# Patient Record
Sex: MALE | URBAN - NONMETROPOLITAN AREA
[De-identification: names, ages, dates, MRNs, and addresses within clinical notes are randomized per-mention and may not be internally consistent; named-entity substitution may affect disease eponyms.]

---

## 2022-03-14 ENCOUNTER — APPOINTMENT (RX ONLY)
Dept: URBAN - NONMETROPOLITAN AREA CLINIC 13 | Facility: CLINIC | Age: 62
Setting detail: DERMATOLOGY
End: 2022-03-14

## 2022-03-14 DIAGNOSIS — D49.2 NEOPLASM OF UNSPECIFIED BEHAVIOR OF BONE, SOFT TISSUE, AND SKIN: ICD-10-CM

## 2022-03-14 PROCEDURE — ? PATIENT SPECIFIC COUNSELING

## 2022-03-14 PROCEDURE — ? OBSERVATION AND MEASURE

## 2022-03-14 PROCEDURE — 99202 OFFICE O/P NEW SF 15 MIN: CPT

## 2022-03-14 PROCEDURE — ? REFERRAL

## 2022-03-14 ASSESSMENT — LOCATION ZONE DERM: LOCATION ZONE: SCALP

## 2022-03-14 ASSESSMENT — LOCATION SIMPLE DESCRIPTION DERM: LOCATION SIMPLE: SCALP

## 2022-03-14 ASSESSMENT — LOCATION DETAILED DESCRIPTION DERM: LOCATION DETAILED: RIGHT INFERIOR POSTAURICULAR SKIN

## 2022-03-14 NOTE — HPI: SKIN LESIONS
Is This A New Presentation, Or A Follow-Up?: Growths
Have Your Skin Lesions Been Treated?: been treated
Additional History: Patient reports this lesion was biopsied at [facility] in [place] years ago.

## 2022-03-15 VITALS — HEIGHT: 69 IN | WEIGHT: 155 LBS
